# Patient Record
Sex: MALE | Race: WHITE | NOT HISPANIC OR LATINO | Employment: FULL TIME | URBAN - METROPOLITAN AREA
[De-identification: names, ages, dates, MRNs, and addresses within clinical notes are randomized per-mention and may not be internally consistent; named-entity substitution may affect disease eponyms.]

---

## 2019-02-11 ENCOUNTER — TELEPHONE (OUTPATIENT)
Dept: SCHEDULING | Facility: IMAGING CENTER | Age: 34
End: 2019-02-11

## 2019-03-22 ENCOUNTER — OFFICE VISIT (OUTPATIENT)
Dept: INTERNAL MEDICINE | Facility: MEDICAL CENTER | Age: 34
End: 2019-03-22
Payer: COMMERCIAL

## 2019-03-22 VITALS
DIASTOLIC BLOOD PRESSURE: 66 MMHG | HEIGHT: 71 IN | BODY MASS INDEX: 32.73 KG/M2 | TEMPERATURE: 97.8 F | SYSTOLIC BLOOD PRESSURE: 110 MMHG | WEIGHT: 233.8 LBS | HEART RATE: 64 BPM | OXYGEN SATURATION: 98 %

## 2019-03-22 DIAGNOSIS — L91.8 SKIN TAG: ICD-10-CM

## 2019-03-22 DIAGNOSIS — Z00.00 HEALTHCARE MAINTENANCE: ICD-10-CM

## 2019-03-22 DIAGNOSIS — Z82.49 FAMILY HISTORY OF HEART ATTACK: ICD-10-CM

## 2019-03-22 PROCEDURE — 99385 PREV VISIT NEW AGE 18-39: CPT | Mod: GC | Performed by: INTERNAL MEDICINE

## 2019-03-22 NOTE — PROGRESS NOTES
New Patient to Establish    Reason to establish: New patient to establish    CC: Preventative health visit, skin lesion    HPI:   34-year-old pleasant male with no significant past medical history presents for preventative health visit and complains of skin lesion on right side of nose.     Patient notes having a skin lesion for the past 4-5 years, but it has grown gradually.  It bled once 1-2 months ago after patient got out of the shower and possibly scratched it.  No easy bleeding otherwise.  No discoloration.  Does not use sunblock.    Please see assessment and plan below for preventative health discussion and additional discussion of patient's concerns.    Patient Active Problem List    Diagnosis Date Noted   • Family history of heart attack 03/22/2019       History reviewed. No pertinent past medical history.    No current outpatient prescriptions on file.     No current facility-administered medications for this visit.        Allergies as of 03/22/2019   • (No Known Allergies)       Social History     Social History   • Marital status:      Spouse name: N/A   • Number of children: N/A   • Years of education: N/A     Occupational History   • Not on file.     Social History Main Topics   • Smoking status: Never Smoker   • Smokeless tobacco: Never Used   • Alcohol use Yes      Comment: occ   • Drug use: No   • Sexual activity: Yes     Partners: Female     Other Topics Concern   • Not on file     Social History Narrative   • No narrative on file       Family History   Problem Relation Age of Onset   • Asthma Mother    • Heart Disease Father 40   • Hyperlipidemia Father    • Hypertension Father    • Alcohol/Drug Father         drug addict       History reviewed. No pertinent surgical history.    ROS: As per HPI. Additional pertinent systems as noted below.  Constitutional: Negative for chills and fever.   HENT: Negative for ear pain and sore throat.    Eyes: Negative for discharge and redness.  "  Respiratory: Negative for cough, hemoptysis, wheezing and stridor.    Cardiovascular: Negative for chest pain, palpitations and leg swelling.   Gastrointestinal: Negative for abdominal pain, constipation, diarrhea, heartburn, nausea and vomiting.   Genitourinary: Negative for dysuria, flank pain and hematuria.   Musculoskeletal: Negative for falls and myalgias.   Skin: Negative for itching and rash. worried about skin lesion on right nose  Neurological: Negative for dizziness, seizures, loss of consciousness and headaches.   Endo/Heme/Allergies: Negative for polydipsia. Does not bruise/bleed easily.   Psychiatric/Behavioral: Negative for substance abuse and suicidal ideas.       /66 (BP Location: Right arm, Patient Position: Sitting, BP Cuff Size: Adult)   Pulse 64   Temp 36.6 °C (97.8 °F) (Temporal)   Ht 1.797 m (5' 10.75\")   Wt 106.1 kg (233 lb 12.8 oz)   SpO2 98%   BMI 32.84 kg/m²     Physical Exam  General:  Alert and oriented, No apparent distress.    Eyes: Pupils equal and reactive. No scleral icterus.    Throat: Clear no erythema or exudates noted.    Neck: Supple. No lymphadenopathy noted. Thyroid not enlarged.    Lungs: Clear to auscultation bilaterally with no wheezing or crackles.    Cardiovascular: Regular rate and rhythm. No murmurs, rubs or gallops.    Abdomen:  Benign. No rebound or guarding noted.    Extremities: No clubbing, cyanosis, edema.    Skin: Clear. No rash or suspicious skin lesions noted. Lesion on right side of nose, is skin colored, round, polypoid 1-2mm lesion with no erythema, vascularity noted.     Note: I have reviewed all pertinent labs and diagnostic tests associated with this visit with specific comments listed under the assessment and plan below    Assessment and Plan    1. Healthcare maintenance  Vaccination Discussion:  tdap- 9/2017  Flu- not recently   >> >> Flu shot recommended later this year, 2019    Cancer Screening Discussion:  Denies family history of " prostate or colon cancer.   >> recommended sun block use regularly.     Lifestyle and functioning Discussion:  Tobacco- never  Alcohol- occasional   Recreational drug use- no  Physical activity-  tries to go hiking almost every weekend.   Dietary habits- says diet is poor, lots of fast food. But notes that he has decreased soda intake from 5-6 times daily, to once with dinner meal  >> educated about dietary improvements, starting with reducing pure sugar intake such as sodas/juices/candies initially and then can slowly work towards incorporating additional changes such as more fresh foods and proteins.     Metabolic Health discussion:  BMI- 32.84  Blood pressure- 110/66  >> CMP, A1c, and lipid panel ordered.     Infectious Disease Discussion:  Interested in STD testing?- not interested in STI testing, not even HIV testing.     Behavioral Health Discussion:  Phq2- negative    2. Family history of heart attack  Per patient, his father had heart attack in his 40s.  He does also note that patient had issues with substance abuse.  Lipid panel ordered.    3. Skin tag  Benign, asymptomatic skin tag on right outer nare. Not concerning for malignancy. CTM for changes. discussed with patient to look for rapid growth, vascularity, easy bleeding, or pigmentation etc.        Followup: Return in about 1 year (around 3/22/2020).      Signed by: Pedro Hassan M.D.

## 2019-03-25 ENCOUNTER — HOSPITAL ENCOUNTER (OUTPATIENT)
Dept: LAB | Facility: MEDICAL CENTER | Age: 34
End: 2019-03-25
Attending: STUDENT IN AN ORGANIZED HEALTH CARE EDUCATION/TRAINING PROGRAM
Payer: COMMERCIAL

## 2019-03-25 DIAGNOSIS — Z00.00 HEALTHCARE MAINTENANCE: ICD-10-CM

## 2019-03-25 LAB
ALBUMIN SERPL BCP-MCNC: 4.7 G/DL (ref 3.2–4.9)
ALBUMIN/GLOB SERPL: 1.9 G/DL
ALP SERPL-CCNC: 55 U/L (ref 30–99)
ALT SERPL-CCNC: 22 U/L (ref 2–50)
ANION GAP SERPL CALC-SCNC: 9 MMOL/L (ref 0–11.9)
AST SERPL-CCNC: 19 U/L (ref 12–45)
BILIRUB SERPL-MCNC: 0.5 MG/DL (ref 0.1–1.5)
BUN SERPL-MCNC: 14 MG/DL (ref 8–22)
CALCIUM SERPL-MCNC: 9.6 MG/DL (ref 8.5–10.5)
CHLORIDE SERPL-SCNC: 104 MMOL/L (ref 96–112)
CHOLEST SERPL-MCNC: 133 MG/DL (ref 100–199)
CO2 SERPL-SCNC: 26 MMOL/L (ref 20–33)
CREAT SERPL-MCNC: 0.98 MG/DL (ref 0.5–1.4)
FASTING STATUS PATIENT QL REPORTED: NORMAL
GLOBULIN SER CALC-MCNC: 2.5 G/DL (ref 1.9–3.5)
GLUCOSE SERPL-MCNC: 95 MG/DL (ref 65–99)
HDLC SERPL-MCNC: 45 MG/DL
LDLC SERPL CALC-MCNC: 72 MG/DL
POTASSIUM SERPL-SCNC: 3.8 MMOL/L (ref 3.6–5.5)
PROT SERPL-MCNC: 7.2 G/DL (ref 6–8.2)
SODIUM SERPL-SCNC: 139 MMOL/L (ref 135–145)
TRIGL SERPL-MCNC: 82 MG/DL (ref 0–149)

## 2019-03-25 PROCEDURE — 80053 COMPREHEN METABOLIC PANEL: CPT

## 2019-03-25 PROCEDURE — 83036 HEMOGLOBIN GLYCOSYLATED A1C: CPT

## 2019-03-25 PROCEDURE — 80061 LIPID PANEL: CPT

## 2019-03-25 PROCEDURE — 36415 COLL VENOUS BLD VENIPUNCTURE: CPT

## 2019-03-28 LAB
EST. AVERAGE GLUCOSE BLD GHB EST-MCNC: 111 MG/DL
HBA1C MFR BLD: 5.5 % (ref 0–5.6)

## 2022-01-04 ENCOUNTER — HOSPITAL ENCOUNTER (EMERGENCY)
Facility: MEDICAL CENTER | Age: 37
End: 2022-01-04
Attending: EMERGENCY MEDICINE
Payer: COMMERCIAL

## 2022-01-04 VITALS
DIASTOLIC BLOOD PRESSURE: 88 MMHG | HEART RATE: 92 BPM | RESPIRATION RATE: 16 BRPM | TEMPERATURE: 98.2 F | WEIGHT: 239.2 LBS | SYSTOLIC BLOOD PRESSURE: 147 MMHG | HEIGHT: 72 IN | BODY MASS INDEX: 32.4 KG/M2 | OXYGEN SATURATION: 98 %

## 2022-01-04 DIAGNOSIS — M25.512 CHRONIC LEFT SHOULDER PAIN: ICD-10-CM

## 2022-01-04 DIAGNOSIS — G89.29 CHRONIC LEFT SHOULDER PAIN: ICD-10-CM

## 2022-01-04 PROCEDURE — 99283 EMERGENCY DEPT VISIT LOW MDM: CPT

## 2022-01-04 PROCEDURE — 700102 HCHG RX REV CODE 250 W/ 637 OVERRIDE(OP): Performed by: EMERGENCY MEDICINE

## 2022-01-04 PROCEDURE — A9270 NON-COVERED ITEM OR SERVICE: HCPCS | Performed by: EMERGENCY MEDICINE

## 2022-01-04 RX ORDER — HYDROCODONE BITARTRATE AND ACETAMINOPHEN 5; 325 MG/1; MG/1
1 TABLET ORAL ONCE
Status: COMPLETED | OUTPATIENT
Start: 2022-01-04 | End: 2022-01-04

## 2022-01-04 RX ORDER — MELOXICAM 7.5 MG/1
7.5 TABLET ORAL DAILY
Qty: 14 TABLET | Refills: 0 | Status: SHIPPED | OUTPATIENT
Start: 2022-01-04

## 2022-01-04 RX ADMIN — HYDROCODONE BITARTRATE AND ACETAMINOPHEN 1 TABLET: 5; 325 TABLET ORAL at 17:06

## 2022-01-04 ASSESSMENT — ENCOUNTER SYMPTOMS
FEVER: 0
TINGLING: 0
CHILLS: 0

## 2022-01-04 NOTE — ED TRIAGE NOTES
Chief Complaint   Patient presents with   • Shoulder Pain     x9 months, denies trauma. He took ibuprofen this morning that did not help but has not taken pain meds prior to today. Has not seen and MD since pain started       Patient to triage ambulatory with a steady gait, AAOx4, Appropriate precautions in place.     Explained wait time and triage process. Placed back in lobby. Told to notify ED tech or RN of any changes, verbalized understanding.    /99   Pulse 84   Temp 36.2 °C (97.2 °F) (Temporal)   Resp 16   Ht 1.829 m (6')   Wt 109 kg (239 lb 3.2 oz)   SpO2 99%   BMI 32.44 kg/m²

## 2022-01-05 NOTE — DISCHARGE INSTRUCTIONS
Rest, use sling for comfort. Follow-up with orthopaedics surgery. Return to the ER for any new medical problems or concerns.

## 2022-01-05 NOTE — ED PROVIDER NOTES
ED Provider Note    Scribed for Joel Olivier M.D. by Evon Varner. 1/4/2022, 4:39 PM.    Primary care provider: Pedro Hassan M.D. (Inactive)  Means of arrival: Walk-in  History obtained from: Patient  History limited by: None    CHIEF COMPLAINT  Chief Complaint   Patient presents with   • Shoulder Pain     x9 months, denies trauma. He took ibuprofen this morning that did not help but has not taken pain meds prior to today. Has not seen and MD since pain started     PPE Note: I personally donned full PPE for all patient encounters during this visit, including wearing an N95 respirator mask, gloves, and eye protection. Scribe remained outside the patient's room and did not have any contact with the patient for the duration of patient encounter.    ROHAN Barros is a 36 y.o. male who presents to the Emergency Department with left shoulder pain onset 9 months ago. He states the pain radiates from his collar bone to his shoulder blade. He denies any trauma to the area or any specific moment of injury. He has tried to treat the pain with ibuprofen with little to no alleviation. He denies any numbness or tingling in the left upper extremity, fever, and chills. 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 He denies any major past medical history. He denies any known allergies to medication.  Nothing is acutely changed her become worse.  He just decided to come in because he is tired of dealing with this.  Not taking any medications.    REVIEW OF SYSTEMS  Review of Systems   Constitutional: Negative for chills and fever.   Musculoskeletal:        Shoulder pain   Neurological: Negative for tingling.       PAST MEDICAL HISTORY   None noted when reviewed with the patient.     SURGICAL HISTORY  patient denies any surgical history    SOCIAL HISTORY  Social History     Tobacco Use   • Smoking status: Never Smoker   • Smokeless tobacco: Never Used   Substance Use Topics   • Alcohol use: Yes     Comment: occ   • Drug use: No       Social History     Substance and Sexual Activity   Drug Use No       FAMILY HISTORY  Family History   Problem Relation Age of Onset   • Asthma Mother    • Heart Disease Father 40   • Hyperlipidemia Father    • Hypertension Father    • Alcohol/Drug Father         drug addict       CURRENT MEDICATIONS  Home Medications     Reviewed by Mireya May R.N. (Registered Nurse) on 01/04/22 at 1438  Med List Status: Partial   Medication Last Dose Status        Patient Etienne Taking any Medications                       ALLERGIES  No Known Allergies    PHYSICAL EXAM  VITAL SIGNS: /99   Pulse 84   Temp 36.2 °C (97.2 °F) (Temporal)   Resp 16   Ht 1.829 m (6')   Wt 109 kg (239 lb 3.2 oz)   SpO2 99%   BMI 32.44 kg/m²   Vitals reviewed.  Constitutional: Well developed, Well nourished, No acute distress, Non-toxic appearance.   HENT: Normocephalic, Atraumatic  Eyes: PERRL, EOMI, Conjunctiva normal, No discharge. g  Abdomen: Bowel sounds normal, Soft, No tenderness  Skin: Warm, Dry, No erythema, No rash.   Back: No tenderness, No CVA tenderness.   Musculoskeletal: Left shoulder: Good range of motion of the glenohumeral joint without pain or tenderness.  With abduction of the humerus there is pain over the scapular area and muscles.  No bony tenderness over the scapula or clavicle.  The trapezius muscle tenderness.  Normal neurovascular exam good pulses and perfusion.. Good range of motion in all major joints. No edema. No tenderness to palpation or major deformities noted.   Neurologic: Alert, No focal deficits noted.   Psychiatric: Affect normal    COURSE & MEDICAL DECISION MAKING  Pertinent Labs & Imaging studies reviewed. (See chart for details)    4:39 PM Patient seen and examined at bedside. The patient presents with left shoulder pain.  This is longstanding.  It seems musculoskeletal in nature.  I do think he benefit from seeing orthopedics he may require a steroid injection or maybe even physical therapy.   For now she will conservatively with anti-inflammatories sling and rest and return precautions.  He is given a Norco for his acute discomfort.  Placed in a sling.  Patient will be treated with Norco 5-325 mg for his symptoms. I will place the patient in a sling. I informed the patient about the plan for discharge at this time with follow up instructions and a prescription for Mobic 7.5 mg.     Patient received opportunity ask questions.  He is given discharge instructions.  Questions answered is agreeable to plan and discharged in good condition.        The patient will return for new or worsening symptoms and is stable at the time of discharge.     The patient is referred to a primary physician for blood pressure management, diabetic screening, and for all other preventative health concerns.    DISPOSITION:  Patient will be discharged home in stable condition.    FOLLOW UP:  Daniel Guevara M.D.  [address] [facility] [address]  [phone]    Schedule an appointment as soon as possible for a visit in 2 days        OUTPATIENT MEDICATIONS:  New Prescriptions    MELOXICAM (MOBIC) 7.5 MG TAB    Take 1 Tablet by mouth every day.       FINAL IMPRESSION  1. Chronic left shoulder pain          Evon AVILEZ (Oj), am scribing for, and in the presence of, Joel Olivier M.D..    Electronically signed by: Evon Varner (Oj), 1/4/2022    Joel AVILEZ M.D. personally performed the services described in this documentation, as scribed by Evon Varner in my presence, and it is both accurate and complete.    The note accurately reflects work and decisions made by me.  Joel Olivier M.D.  1/4/2022  8:05 PM

## 2022-01-05 NOTE — ED NOTES
.Patient discharged in stable condition per order. Oral and written discharge instructions reviewed. Medications sent to home pharmacy. New medications reviewed. All belongings accounted for and taken with patient. Wristband cut off per protocol. Questions answered, and patient agrees with discharge plan. Patient escorted to lobby. Patient aware to follow up with ortho.